# Patient Record
Sex: MALE | Race: OTHER | NOT HISPANIC OR LATINO | URBAN - METROPOLITAN AREA
[De-identification: names, ages, dates, MRNs, and addresses within clinical notes are randomized per-mention and may not be internally consistent; named-entity substitution may affect disease eponyms.]

---

## 2021-10-25 ENCOUNTER — EMERGENCY (EMERGENCY)
Facility: HOSPITAL | Age: 26
LOS: 1 days | Discharge: HOME | End: 2021-10-25
Attending: EMERGENCY MEDICINE | Admitting: EMERGENCY MEDICINE
Payer: COMMERCIAL

## 2021-10-25 VITALS
HEART RATE: 117 BPM | DIASTOLIC BLOOD PRESSURE: 75 MMHG | OXYGEN SATURATION: 99 % | HEIGHT: 72 IN | SYSTOLIC BLOOD PRESSURE: 133 MMHG | TEMPERATURE: 98 F | RESPIRATION RATE: 20 BRPM | WEIGHT: 259.93 LBS

## 2021-10-25 PROCEDURE — 99283 EMERGENCY DEPT VISIT LOW MDM: CPT

## 2021-10-25 PROCEDURE — 99284 EMERGENCY DEPT VISIT MOD MDM: CPT

## 2021-10-25 RX ORDER — FAMOTIDINE 10 MG/ML
20 INJECTION INTRAVENOUS ONCE
Refills: 0 | Status: DISCONTINUED | OUTPATIENT
Start: 2021-10-25 | End: 2021-10-29

## 2021-10-25 RX ORDER — DIPHENHYDRAMINE HCL 50 MG
50 CAPSULE ORAL ONCE
Refills: 0 | Status: DISCONTINUED | OUTPATIENT
Start: 2021-10-25 | End: 2021-10-29

## 2021-10-25 RX ORDER — SODIUM CHLORIDE 9 MG/ML
1000 INJECTION INTRAMUSCULAR; INTRAVENOUS; SUBCUTANEOUS ONCE
Refills: 0 | Status: DISCONTINUED | OUTPATIENT
Start: 2021-10-25 | End: 2021-10-29

## 2021-10-25 NOTE — ED PROVIDER NOTE - OBJECTIVE STATEMENT
24yo male who presents with anaphylactic reaction to food. pt was eating mediteranean food tonight and there was a sauce with spices and he started to break out into a rash, felt chest pain and sob, wheezing, no change in voice or stridor, pt did not take anything for the reaction

## 2021-10-25 NOTE — ED PROVIDER NOTE - PROGRESS NOTE DETAILS
pt reevaluated, feeling much better, redness, rash and swelling has improved, pt to be d/c home with prednisone, follow up with pmd for allergy testing, return if any symptoms worsen

## 2021-10-25 NOTE — ED PROVIDER NOTE - NSFOLLOWUPINSTRUCTIONS_ED_ALL_ED_FT
Food Allergy    WHAT YOU NEED TO KNOW:    A food allergy is an immune system reaction to a food. A food allergen is an ingredient or chemical in a food that causes your immune system to react. Allergic reactions happen when your immune system fights too strongly against an allergen and causes you to get sick. Allergic reactions can happen within minutes to several hours after you eat, touch, or smell the food. You can also have a second reaction up to 8 hours later.     DISCHARGE INSTRUCTIONS:    Call 911 for signs or symptoms of anaphylaxis, such as trouble breathing, swelling in your mouth or throat, or wheezing. You may also have itching, a rash, hives, or feel like you are going to faint.    Return to the emergency department if:   •Your mouth, tongue, or throat swells.      •You have itching or hives that spread all over your body.      Contact your healthcare provider if:   •You have new or worsening rashes, hives, or itching.      •You have an upset stomach or are vomiting.      •You have stomach cramps or diarrhea.      •You have questions about your treatment, medicine, or care.      Medicines:   •Epinephrine is used to treat severe allergic reactions such as anaphylaxis.       •Antihistamines decrease mild symptoms such as itching or a rash.      •Steroids: This medicine may be given to decrease inflammation.      •Short-acting bronchodilators: You may need short-acting bronchodilators to help open your airways quickly. These medicines may be called rescue inhalers or relievers. They relieve sudden, severe symptoms and start to work right away.       •Take your medicine as directed. Contact your healthcare provider if you think your medicine is not helping or if you have side effects. Tell him or her if you are allergic to any medicine. Keep a list of the medicines, vitamins, and herbs you take. Include the amounts, and when and why you take them. Bring the list or the pill bottles to follow-up visits. Carry your medicine list with you in case of an emergency.      Follow up with your healthcare provider as directed: You may need to see specialists for ongoing care. Your healthcare provider may want to test you regularly to see if the food allergy changes. Write down your questions so you remember to ask them during follow-up visits.    Steps to take for signs or symptoms of anaphylaxis:   •Immediately give 1 shot of epinephrine only into the outer thigh muscle.       •Leave the shot in place as directed. Your healthcare provider may recommend you leave it in place for up to 10 seconds before you remove it. This helps make sure all of the epinephrine is delivered.       •Call 911 and go to the emergency department, even if the shot improved symptoms. Do not drive yourself. Bring the used epinephrine shot with you.       Safety precautions to take if you are at risk for anaphylaxis:   •Keep 2 shots of epinephrine with you at all times. You may need a second shot, because epinephrine only works for about 20 minutes and symptoms may return. Your healthcare provider can show you and family members how to give the shot. Check the expiration date every month and replace it before it expires.      •Create an action plan. Your healthcare provider can help you create a written plan that explains the allergy and an emergency plan to treat a reaction. The plan explains when to give a second epinephrine shot if symptoms return or do not improve after the first. Give copies of the action plan and emergency instructions to family members, work and school staff, and  providers. Show them how to give a shot of epinephrine. Update the plan as the allergy changes.      •Be careful when you exercise. If you have had exercise-induced anaphylaxis, do not exercise right after you eat. Stop exercising right away if you start to develop any signs or symptoms of anaphylaxis. You may first feel tired, warm, or have itchy skin. Hives, swelling, and severe breathing problems may develop if you continue to exercise.      •Carry medical alert identification. Wear jewelry or carry a card that says you have a food allergy. Ask your healthcare provider where to get these items.       •Do not eat the food that causes your allergy. Even a small taste can cause an allergic reaction. Your healthcare provider or a dietitian can help you plan a balanced diet. Babies may need to drink a formula that does not contain milk or soy. A dietitian can teach you how to read labels for ingredients that cause your allergies.       •Ask about ingredients in foods prepared outside your home. When you eat out, ask what is in the food you want to order. Ask how food is prepared. Fried foods may contain small amounts of food allergens, such as nuts and shellfish.      •Use good hygiene. Do not share utensils or food. Wash your hands before and after meals.       Flu vaccine and egg allergy: Do not get the nasal spray form of the flu vaccine if you have an egg allergy. The nasal spray may contain egg proteins that can cause anaphylaxis. Ask your healthcare provider if the injection form of the vaccine is safe for

## 2021-10-25 NOTE — ED ADULT NURSE NOTE - CHPI ED NUR SYMPTOMS POS
ANXIETY/DIFFICULTY BREATHING/HIVES/ITCHING/RASH/REDNESS/SHORTNESS OF BREATH/SWELLING OF FACE, TONGUE/THROAT ITCHING

## 2021-10-25 NOTE — ED PROVIDER NOTE - PATIENT PORTAL LINK FT
You can access the FollowMyHealth Patient Portal offered by [facility] by registering at the following website: [email or web address]. By joining [employer]’s FollowMyHealth portal, you will also be able to view your health information using other applications (apps) compatible with our system.

## 2021-10-25 NOTE — ED PROVIDER NOTE - MUSCULOSKELETAL, MLM
[facility] calling re: mutual patient  Dr. Simpson would like to speak with patient's PCP  Best time to reach her would be between 12:30 and 2  They are open today until [phone]  [place] HASSAN  
Spine appears normal, range of motion is not limited, no muscle or joint tenderness

## 2021-10-25 NOTE — ED ADULT TRIAGE NOTE - CHIEF COMPLAINT QUOTE
Patient presents to ED with allergic reaction noted with shortness of breath; ate mediterranean food stated he has no allergy to anything

## 2021-10-25 NOTE — ED ADULT NURSE NOTE - OBJECTIVE STATEMENT
Pt to ED c/c hives, chest tightness, short of breath. Pt states onset of symptoms 30 minutes PTA after eating take out mediterranean food. Skin is flushed with disseminated hives, acute respiratory distress with chest tightness noted. Pt denies hx of allergies.

## 2021-10-26 VITALS
HEART RATE: 90 BPM | TEMPERATURE: 98 F | DIASTOLIC BLOOD PRESSURE: 67 MMHG | RESPIRATION RATE: 15 BRPM | SYSTOLIC BLOOD PRESSURE: 107 MMHG | OXYGEN SATURATION: 98 %

## 2021-10-26 RX ORDER — EPINEPHRINE 0.3 MG/.3ML
0.3 INJECTION INTRAMUSCULAR; SUBCUTANEOUS
Qty: 1 | Refills: 0
Start: 2021-10-26

## 2022-06-11 ENCOUNTER — EMERGENCY (EMERGENCY)
Facility: HOSPITAL | Age: 27
LOS: 1 days | Discharge: HOME | End: 2022-06-11
Attending: EMERGENCY MEDICINE | Admitting: EMERGENCY MEDICINE
Payer: COMMERCIAL

## 2022-06-11 VITALS
OXYGEN SATURATION: 100 % | DIASTOLIC BLOOD PRESSURE: 107 MMHG | HEART RATE: 143 BPM | RESPIRATION RATE: 20 BRPM | TEMPERATURE: 98 F | SYSTOLIC BLOOD PRESSURE: 152 MMHG | HEIGHT: 72 IN

## 2022-06-11 VITALS
DIASTOLIC BLOOD PRESSURE: 86 MMHG | SYSTOLIC BLOOD PRESSURE: 138 MMHG | HEART RATE: 106 BPM | TEMPERATURE: 98 F | OXYGEN SATURATION: 100 % | RESPIRATION RATE: 16 BRPM

## 2022-06-11 PROBLEM — Z78.9 OTHER SPECIFIED HEALTH STATUS: Chronic | Status: ACTIVE | Noted: 2021-10-25

## 2022-06-11 PROCEDURE — 99053 MED SERV 10PM-8AM 24 HR FAC: CPT

## 2022-06-11 PROCEDURE — 99284 EMERGENCY DEPT VISIT MOD MDM: CPT

## 2022-06-11 RX ORDER — EPINEPHRINE 0.3 MG/.3ML
0.3 INJECTION INTRAMUSCULAR; SUBCUTANEOUS ONCE
Refills: 0 | Status: COMPLETED | OUTPATIENT
Start: 2022-06-11 | End: 2022-06-11

## 2022-06-11 RX ORDER — DIPHENHYDRAMINE HCL 50 MG
25 CAPSULE ORAL ONCE
Refills: 0 | Status: COMPLETED | OUTPATIENT
Start: 2022-06-11 | End: 2022-06-11

## 2022-06-11 RX ORDER — FAMOTIDINE 10 MG/ML
20 INJECTION INTRAVENOUS ONCE
Refills: 0 | Status: COMPLETED | OUTPATIENT
Start: 2022-06-11 | End: 2022-06-11

## 2022-06-11 RX ORDER — EPINEPHRINE 0.3 MG/.3ML
0.3 INJECTION INTRAMUSCULAR; SUBCUTANEOUS
Qty: 1 | Refills: 0
Start: 2022-06-11

## 2022-06-11 RX ADMIN — Medication 60 MILLIGRAM(S): at 01:55

## 2022-06-11 RX ADMIN — FAMOTIDINE 20 MILLIGRAM(S): 10 INJECTION INTRAVENOUS at 01:56

## 2022-06-11 RX ADMIN — EPINEPHRINE 0.3 MILLIGRAM(S): 0.3 INJECTION INTRAMUSCULAR; SUBCUTANEOUS at 01:57

## 2022-06-11 RX ADMIN — Medication 25 MILLIGRAM(S): at 02:11

## 2022-06-11 NOTE — ED ADULT TRIAGE NOTE - CHIEF COMPLAINT QUOTE
Pt. states he ate spicy food 30 minutes PTA, c/o throat itchiness, hives noted to chest  and bilateral arms. Facial redness noted. Patient speaking in clear full sentences. No drooling or stridor noted. Charge RN notified. EKG in progress,

## 2022-06-11 NOTE — ED PROVIDER NOTE - OBJECTIVE STATEMENT
25 yo M presenting with difficulty breathing and throat tightening, concern for allergic reaction after eating a sandwich. Says he has never had allergies before to any food or medicine. Symptoms started several minutes after eating. Pt took 3 Benadryl and came to ED. Endorses SOB, throat tightness, hives. No N/V, diarrhea, fevers. No chest pain. Breathing well on exam, not wheezing, satting high 90s

## 2022-06-11 NOTE — ED PROVIDER NOTE - ATTENDING CONTRIBUTION TO CARE
27yo M with no PMHX or known allergies p/w pruritic facial and arm rash and sensation of throat itchiness, tightness, and sob that began immediately after eating lamb gyro sandwich from food cart shortly prior to arrival. pt took 3 benadryl tabs prior to arrival.  no n/v/d, tongue or lip swelling, or prior similar episodes    General: Patient alert in no apparent distress  Skin: Dry and intact. +urticaria to b/l UEs and redness to face. no swelling to lips or tongue   HEENT: Head atraumatic. Oral mucosa moist.   Eyes: Conjunctiva normal  Cardiac: Regular rhythm and +tachycardia. No pretibial edema b/l  Respiratory: Lungs clear b/l and symmetric. No respiratory distress. Able to speak in complete sentences.  Gastrointestinal: Abdomen soft, nondistended, nontender  Musculoskeletal: Moves all extremities spontaneously  Neurological: alert and oriented to person, place, and time  Psychiatric: Calm and cooperative    a/p  allergic reaction  due to cardiovascular (tachycardia) and dermatologic involvement (rash) will tx for ananaphylaxis   IM epi STAT  antihistamines, steroids, observe in ED  will need epipen and rx for antihistamines and steroids upon discharge    Critical care billing:  Upon my evaluation, this patient had a high probability of imminent or life-threatening deterioration due to anaphylaxis, which required my direct attention, intervention, and personal management.  The patient has a  medical condition that impairs one or more vital organ systems.  Frequent personal assessment and adjustment of medical interventions was performed.      I have personally provided 30 minutes of critical care time exclusive of time spent on separately billable procedures. Time includes review of laboratory data, radiology results, discussion with consultants, patient and family; monitoring for potential decompensation, as well as time spent retrieving data and reviewing the chart and documenting the visit. Interventions were performed as documented above.

## 2022-06-11 NOTE — ED PROVIDER NOTE - NS ED ROS FT
GENERAL: No fever, chills  EYES: no vision changes, no discharge.   ENT: no difficulty swallowing or speaking   CARDIAC: no chest pain/pressure, lower extremity swelling  PULMONARY: no cough, +SOB. +tight throat  GI: no abdominal pain, n/v/d  : no dysuria  SKIN: +rashes  NEURO: no headache, lightheadedness, paresthesia  MSK: No joint pain, myalgia, weakness.

## 2022-06-11 NOTE — ED PROVIDER NOTE - PATIENT PORTAL LINK FT
You can access the FollowMyHealth Patient Portal offered by [facility] by registering at the following website: [email or web address]. By joining [employer]’s FollowMyHealth portal, you will also be able to view your health information using other applications (apps) compatible with our system.

## 2022-06-11 NOTE — ED ADULT NURSE NOTE - OBJECTIVE STATEMENT
Pt arrives to ED rm 28 A&Ox4 and ambulatory c/o allergic reaction after eating a lamb sandwich with hot sauce tonight. Pt has never had an allergic reaction before. Pt never tried the hot sauce from this restaurant prior to tonight, then about 10 minutes after eating he felt warmth on his ears then began to feel a swelling on his forehead and his body to begin breaking out in hives. Pt states that he took 3 benadryl before 15 minutes prior to arriving to ED. Pt skin noted to be red, warm to touch. Airway is patent, 02 ast is 100% on room air, no drooling noted, able to speak in full sentences. Pt is sinus tachy on cardiac monitor, respirations are even and unlabored. Medicated as per MD order

## 2022-06-11 NOTE — ED ADULT TRIAGE NOTE - RESPIRATORY RATE (BREATHS/MIN)
Karmen Meigs is requesting a refill on the following medication(s):  Requested Prescriptions     Pending Prescriptions Disp Refills    FLUoxetine (PROZAC) 20 MG capsule 30 capsule 5     Sig: Take 1 capsule by mouth daily    traZODone (DESYREL) 50 MG tablet 30 tablet 5     Sig: Take 1 tablet by mouth nightly as needed for Sleep    hydrOXYzine (ATARAX) 50 MG tablet 30 tablet 5     Sig: Take 1 tablet by mouth 2 times daily as needed for Anxiety    busPIRone (BUSPAR) 10 MG tablet 60 tablet 5       Last Visit Date (If Applicable):  24/02/3565    Next Visit Date:    Visit date not found 20

## 2022-06-11 NOTE — ED PROVIDER NOTE - CLINICAL SUMMARY MEDICAL DECISION MAKING FREE TEXT BOX
Cici - 27 yo M presenting with difficulty breathing and throat tightening, concern for allergic reaction after eating a sandwich. Will give epi, steroids, benadryl, pepcid

## 2022-06-11 NOTE — ED PROVIDER NOTE - NSFOLLOWUPINSTRUCTIONS_ED_ALL_ED_FT
You were seen in the ED for an allergic reaction.    Take 25mg Benadryl every 6 hours for the next 3-4 days.    Take 40mg prednisone for the next 4 days.     If you have another allergic reaction, use the EpiPen as prescribed and return to the ED immediately.    Please follow up with your primary care doctor in 2-3 days. Return to the ED if you experience any worsening or new symptoms or any symptoms that concern you, including fevers, chills, shortness of breath, chest pain      Anaphylactic Reaction, Adult      An anaphylactic reaction (anaphylaxis) is a sudden, very bad allergic reaction. This affects more than one part of your body. It can be life-threatening. If you have a very bad reaction, you need to get medical help right away.      What are the causes?    This condition is caused by exposure to things that give you an allergic reaction (allergens). Common allergens include:  •Foods, such as peanuts, wheat, shellfish, milk, and eggs.      •Medicines.      •Insect bites or stings.      •Blood or parts of blood that are given for treatment (transfusions).      •Chemicals, such as latex and dyes that are used in food and in medical tests.        What are the signs or symptoms?    Signs of a very bad allergic reaction may include:  •Feeling warm in the face (flushed). Your face may turn red.      •Itchy, red, or swollen areas of skin (hives).    •Swelling of:  •The eyes or face.      •The lips, tongue, or mouth.      •The throat.      •Trouble with any of these:  •Breathing.      •Talking.      •Swallowing.        •Feeling dizzy, light-headed, or like you might faint.      •Pain or cramps in your belly.      •Vomiting.      •Watery poop (diarrhea).        How is this treated?  A person using an auto-injector pen in the thigh.   If you think you are having a very bad allergic reaction, you should do this right away:  •Give yourself a shot of medicine (epinephrine) using an auto-injector "pen." Your doctor will teach you how to use this pen.    •Call for emergency help. If you use a pen, you must still get treated in the hospital. There, you may be given:  •Medicines.      •Oxygen.      •Fluids in an IV tube.          Follow these instructions at home:    Safety     •Always keep an auto-injector pen with you. This could save your life. If you can, carry two auto-injector pens. Use the pen as told by your doctor.      • Do not drive after a reaction. Wait until your doctor says it is safe to drive.    •Make sure that you, the people who live with you, and your employer know:  •What you are allergic to, so you can stay away from it.      •How to use your auto-injector pen.        •Wear a bracelet or necklace that says you have an allergy, if your doctor tells you to do this.      •Learn the signs of a very bad allergic reaction. This way, you can treat it right away.      •Work with your doctors to make a plan for what to do if you have a very bad reaction. It is important to be ready.      If you use your auto-injector pen:     •Get more medicine (epinephrine) for your pen right away. This is important in case you have another reaction.      •Get help right away.      General instructions     •Tell all doctors who care for you that you have an allergy.    •If you have itchy, red, swollen areas of skin or a rash:  •Use an over-the-counter medicine (antihistamine) as told by your doctor.      •Put cold, wet cloths on your skin.      •Take a cool bath or shower. Avoid hot water.        •Take over-the-counter and prescription medicines only as told by your doctor.      •Keep all follow-up visits as told by your doctor.        How is this prevented?    •Avoid things that gave you a very bad allergic reaction before.      •Tell your  about your allergy when you go out to eat. If you are not sure if your meal has food that you are allergic to, ask your  before you eat it.        Get help right away if:    •You have signs of an allergic reaction. You may notice them soon after being exposed to things that give you an allergic reaction.      •You had to use your auto-injector pen. You must go to the emergency room even if the medicine seems to be working. This is because another allergic reaction may happen within 3 days (rebound anaphylaxis).      These symptoms may be an emergency. Do not wait to see if the symptoms will go away. Do this right away:   • Use your auto-injector pen as you have been told.        • Get help. Call your local emergency services (911 in the [place] Do not drive yourself to the hospital.         Summary    •An anaphylactic reaction (anaphylaxis) is a sudden, serious allergic reaction.      •This condition can be life-threatening. If you have a reaction, get medical help right away.      •Your doctor will show you how to give yourself a shot (epinephrine injection) with an auto-injector "pen."      •Always keep an auto-injector pen with you. It could save your life. Use it as told by your doctor.      •If you had to use your auto-injector pen, you must still get treated in the hospital.      This information is not intended to replace advice given to you by your health care provider. Make sure you discuss any questions you have with your health care provider.

## 2022-06-11 NOTE — ED PROVIDER NOTE - PHYSICAL EXAMINATION
Belen Bolanos MD  GENERAL: Patient awake alert NAD.  HEENT: NC/AT, Moist mucous membranes, EOMI. no tongue or lip swelling  LUNGS: CTAB, no wheezes or crackles.   CARDIAC: RRR, no m/r/g.    ABDOMEN: Soft, NT, ND, No rebound, guarding. No CVA tenderness.   EXT: No edema. No calf tenderness.   MSK: no pain with movement, no deformities.  NEURO: A&Ox3. Moving all extremities.  SKIN: Warm and dry. +bilateral arm hives  PSYCH: Normal affect.

## 2022-06-11 NOTE — ED PROVIDER NOTE - PROGRESS NOTE DETAILS
pt reassessed, feeling well.  Patient was re-evaluated and doing well. Results, including any incidental findings, were discussed. Return precautions and follow up were discussed. Patient verbalized understanding.

## 2022-09-03 ENCOUNTER — EMERGENCY (EMERGENCY)
Facility: HOSPITAL | Age: 27
LOS: 1 days | Discharge: HOME | End: 2022-09-03
Attending: EMERGENCY MEDICINE | Admitting: EMERGENCY MEDICINE
Payer: COMMERCIAL

## 2022-09-03 VITALS
OXYGEN SATURATION: 97 % | DIASTOLIC BLOOD PRESSURE: 60 MMHG | SYSTOLIC BLOOD PRESSURE: 123 MMHG | RESPIRATION RATE: 18 BRPM | HEART RATE: 73 BPM

## 2022-09-03 VITALS
DIASTOLIC BLOOD PRESSURE: 71 MMHG | HEART RATE: 130 BPM | OXYGEN SATURATION: 97 % | TEMPERATURE: 97 F | HEIGHT: 72 IN | SYSTOLIC BLOOD PRESSURE: 120 MMHG | RESPIRATION RATE: 18 BRPM | WEIGHT: 264.55 LBS

## 2022-09-03 PROCEDURE — 96375 TX/PRO/DX INJ NEW DRUG ADDON: CPT

## 2022-09-03 PROCEDURE — 99291 CRITICAL CARE FIRST HOUR: CPT | Mod: 25

## 2022-09-03 PROCEDURE — 99291 CRITICAL CARE FIRST HOUR: CPT

## 2022-09-03 PROCEDURE — 96374 THER/PROPH/DIAG INJ IV PUSH: CPT

## 2022-09-03 RX ORDER — EPINEPHRINE 0.3 MG/.3ML
0.3 INJECTION INTRAMUSCULAR; SUBCUTANEOUS
Qty: 2 | Refills: 0
Start: 2022-09-03

## 2022-09-03 RX ORDER — FAMOTIDINE 10 MG/ML
20 INJECTION INTRAVENOUS ONCE
Refills: 0 | Status: COMPLETED | OUTPATIENT
Start: 2022-09-03 | End: 2022-09-03

## 2022-09-03 RX ORDER — DIPHENHYDRAMINE HCL 50 MG
50 CAPSULE ORAL ONCE
Refills: 0 | Status: COMPLETED | OUTPATIENT
Start: 2022-09-03 | End: 2022-09-03

## 2022-09-03 RX ORDER — SODIUM CHLORIDE 9 MG/ML
1000 INJECTION INTRAMUSCULAR; INTRAVENOUS; SUBCUTANEOUS ONCE
Refills: 0 | Status: COMPLETED | OUTPATIENT
Start: 2022-09-03 | End: 2022-09-03

## 2022-09-03 RX ADMIN — FAMOTIDINE 20 MILLIGRAM(S): 10 INJECTION INTRAVENOUS at 02:29

## 2022-09-03 RX ADMIN — SODIUM CHLORIDE 1000 MILLILITER(S): 9 INJECTION INTRAMUSCULAR; INTRAVENOUS; SUBCUTANEOUS at 02:29

## 2022-09-03 RX ADMIN — Medication 50 MILLIGRAM(S): at 02:29

## 2022-09-03 RX ADMIN — Medication 125 MILLIGRAM(S): at 02:29

## 2022-09-03 NOTE — ED PROVIDER NOTE - PROGRESS NOTE DETAILS
symptoms resolved symptoms still resolved Reevaluated patient at bedside, patient still asymptomatic, wants to be d/c home to f/u as outpt.  Discussed the results of all diagnostic testing in ED and copies of all reports given.   An opportunity to ask questions was given.  Discussed the importance of prompt, close medical follow-up.  Patient will return with any changes, concerns or persistent / worsening symptoms.  Understanding of all instructions verbalized.

## 2022-09-03 NOTE — ED PROVIDER NOTE - PATIENT PORTAL LINK FT
You can access the FollowMyHealth Patient Portal offered by [facility] by registering at the following website: [email or web address]. By joining [employer]’s FollowMyHealth portal, you will also be able to view your health information using other applications (apps) compatible with our system.

## 2022-09-03 NOTE — ED PROVIDER NOTE - CARE PROVIDER_API CALL
Eugenia Mcgraw)  Family Medicine  [address] [place] [id number]  Phone: [phone]  Fax: [phone]  Follow Up Time: 1-3 Days

## 2022-09-03 NOTE — ED PROVIDER NOTE - ENMT, MLM
Airway patent. Mouth with normal mucosa. Throat has no vesicles, no oropharyngeal exudates and uvula is midline. no swelling of face lips mouth tongue or throat

## 2022-09-03 NOTE — ED ADULT NURSE NOTE - CHIEF COMPLAINT QUOTE
pt ate kabab and develop allergic reacrtion redness over body and pt took epi pen and benadryl no distress

## 2022-09-03 NOTE — ED ADULT NURSE NOTE - OBJECTIVE STATEMENT
Patient received complaining of allergic reaction after eating a kebab earlier this morning, patient unsure what caused the reaction. States used his epi pen and took 2 benadryl PO. Patient is able to talk, breath and swallow with no difficulty, has generalized redness noted, no swelling noted on face. Patient is AOx4, ambulatory, safety precautions in place, interventions implemented, awaiting evaluation.

## 2022-09-03 NOTE — ED ADULT NURSE REASSESSMENT NOTE - NS ED NURSE REASSESS COMMENT FT1
Patient redness decreasing, patient states in no acute distress at this time, awaiting re-evaluation.

## 2022-09-03 NOTE — ED PROVIDER NOTE - OBJECTIVE STATEMENT
Patient with hx allergic reaction to unk food complaining of allergic reaction status post eating food tonight.  Patient relates he had a kebab and developed diffuse itchy rash and some shortness of breath.  Patient took 50 mg of Benadryl p.o. and 1 EpiPen with improvement of his shortness of breath.  Patient denies fever headache nausea vomiting chest pain cough wheezing abd pain swelling of lips mouth tongue or throat.  PMD Ezzi

## 2022-09-03 NOTE — ED PROVIDER NOTE - CHPI ED SYMPTOMS NEG
no cough/no difficulty swallowing/no swelling of face, tongue/no throat itching/no vomiting/no wheezing/no difficulty breathing

## 2022-09-03 NOTE — ED PROVIDER NOTE - CLINICAL SUMMARY MEDICAL DECISION MAKING FREE TEXT BOX
Patient complaining of allergic reaction status post eating food.  Patient relates he had a swarm a and developed diffuse itchy rash and some shortness of breath.  Patient took 50 mg of Benadryl p.o. and 1 EpiPen with improvement of his shortness of breath.  Patient denies fever headache nausea vomiting chest pain cough wheezing swelling of lips mouth tongue or throat.  Plan IV fluid Solu-Medrol Pepcid and Benadryl and observation

## 2023-03-02 NOTE — ED ADULT NURSE NOTE - NS ED NURSE LEVEL OF CONSCIOUSNESS SPEECH
Post-Care Instructions: Patient instructed to not lie down for 4 hours and limit physical activity for 24 hours. Speaking Coherently

## 2023-12-16 ENCOUNTER — EMERGENCY (EMERGENCY)
Facility: HOSPITAL | Age: 28
LOS: 1 days | Discharge: HOME | End: 2023-12-16
Attending: INTERNAL MEDICINE | Admitting: INTERNAL MEDICINE
Payer: COMMERCIAL

## 2023-12-16 VITALS
RESPIRATION RATE: 19 BRPM | SYSTOLIC BLOOD PRESSURE: 132 MMHG | DIASTOLIC BLOOD PRESSURE: 76 MMHG | OXYGEN SATURATION: 99 % | HEART RATE: 91 BPM

## 2023-12-16 VITALS
HEART RATE: 101 BPM | TEMPERATURE: 98 F | HEIGHT: 72 IN | WEIGHT: 259.93 LBS | OXYGEN SATURATION: 97 % | SYSTOLIC BLOOD PRESSURE: 157 MMHG | RESPIRATION RATE: 18 BRPM | DIASTOLIC BLOOD PRESSURE: 97 MMHG

## 2023-12-16 PROCEDURE — 99283 EMERGENCY DEPT VISIT LOW MDM: CPT

## 2023-12-16 PROCEDURE — 99284 EMERGENCY DEPT VISIT MOD MDM: CPT

## 2023-12-16 RX ORDER — FAMOTIDINE 10 MG/ML
1 INJECTION INTRAVENOUS
Qty: 10 | Refills: 0
Start: 2023-12-16 | End: 2023-12-20

## 2023-12-16 NOTE — ED PROVIDER NOTE - PATIENT PORTAL LINK FT
You can access the FollowMyHealth Patient Portal offered by [facility] by registering at the following website: [email or web address]. By joining [employer]’s FollowMyHealth portal, you will also be able to view your health information using other applications (apps) compatible with our system. You can access the FollowMyHealth Patient Portal offered by [facility] by registering at the following website: [email or web address]. By joining [employer]’s FollowMyHealth portal, you will also be able to view your health information using other applications (apps) compatible with our system.

## 2023-12-16 NOTE — ED ADULT NURSE NOTE - NSFALLUNIVINTERV_ED_ALL_ED
Bed/Stretcher in lowest position, wheels locked, appropriate side rails in place/Call bell, personal items and telephone in reach/Instruct patient to call for assistance before getting out of bed/chair/stretcher/Non-slip footwear applied when patient is off stretcher/[place] to call system/Physically safe environment - no spills, clutter or unnecessary equipment/Purposeful proactive rounding/Room/bathroom lighting operational, light cord in reach Bed/Stretcher in lowest position, wheels locked, appropriate side rails in place/Call bell, personal items and telephone in reach/Instruct patient to call for assistance before getting out of bed/chair/stretcher/Non-slip footwear applied when patient is off stretcher/[place] to call system/Physically safe environment - no spills, clutter or unnecessary equipment/Purposeful proactive rounding/Room/bathroom lighting operational, light cord in reach

## 2023-12-16 NOTE — ED ADULT NURSE NOTE - CHIEF COMPLAINT QUOTE
26 y/o male received ambulatory to triage. Alert and oriented x4. C/o hives all over and itching. Pt reports he was eating at a restaurant and started having reaction to what he ate, but does not know what he is allergic to. Airway open and patent. Pt speaking in clear full sentences. Pt allergen is unknown. Pt took 2 tabs of benadryl prior to arrival.

## 2023-12-16 NOTE — ED PROVIDER NOTE - OBJECTIVE STATEMENT
28 y/o male received ambulatory to triage. Alert and oriented x4. C/o hives all over and itching. Pt reports he was eating at a restaurant and started having reaction to what he ate, but does not know what he is allergic to. Airway open and patent. Pt speaking in clear full sentences. Pt allergen is unknown. Pt took 2 tabs of benadryl prior to arrival.  allergic reaction 26 y/o male C/C  hives  and itching after  restaurant meal. Pt took 2 tabs of benadryl prior to arrival and his symptoms resolved   no headache, no chest pain, no SOB, no palpitations, no n/v/d, no urinary symptoms, no bleeding. no neuro changes. 28 y/o male C/C  hives  and itching after  restaurant meal. Pt took 2 tabs of benadryl prior to arrival and his symptoms resolved   no headache, no chest pain, no SOB, no palpitations, no n/v/d, no urinary symptoms, no bleeding. no neuro changes.

## 2023-12-16 NOTE — ED ADULT NURSE NOTE - OBJECTIVE STATEMENT
Pt states he was eating mediterranean food and had an allergic rxn. States his neck and arms began to swell and get red. 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 Took 2 benadryl, symptoms resolved.

## 2023-12-16 NOTE — ED PROVIDER NOTE - NSFOLLOWUPCLINICS_GEN_ALL_ED_FT
[facility]  Allergy  [address] [place] [address]  Phone: [phone]  Fax:      [facility]  Allergy  [address] [place] [address]  Phone: [phone]  Fax:

## 2023-12-16 NOTE — ED ADULT TRIAGE NOTE - CHIEF COMPLAINT QUOTE
26 y/o male received ambulatory to triage. Alert and oriented x4. C/o hives all over and itching. Pt reports he was eating at a restaurant and started having reaction to what he ate, but does not know what he is allergic to. Airway open and patent. Pt speaking in clear full sentences. Pt allergen is unknown. Pt took 2 tabs of benadryl prior to arrival. 28 y/o male received ambulatory to triage. Alert and oriented x4. C/o hives all over and itching. Pt reports he was eating at a restaurant and started having reaction to what he ate, but does not know what he is allergic to. Airway open and patent. Pt speaking in clear full sentences. Pt allergen is unknown. Pt took 2 tabs of benadryl prior to arrival.

## 2024-01-25 ENCOUNTER — LABORATORY RESULT (OUTPATIENT)
Age: 29
End: 2024-01-25

## 2024-01-25 ENCOUNTER — APPOINTMENT (OUTPATIENT)
Dept: PEDIATRIC ALLERGY IMMUNOLOGY | Facility: CLINIC | Age: 29
End: 2024-01-25
Payer: COMMERCIAL

## 2024-01-25 VITALS
SYSTOLIC BLOOD PRESSURE: 122 MMHG | HEIGHT: 72 IN | BODY MASS INDEX: 33.86 KG/M2 | HEART RATE: 73 BPM | OXYGEN SATURATION: 96 % | DIASTOLIC BLOOD PRESSURE: 77 MMHG | WEIGHT: 250 LBS

## 2024-01-25 DIAGNOSIS — L65.9 NONSCARRING HAIR LOSS, UNSPECIFIED: ICD-10-CM

## 2024-01-25 DIAGNOSIS — T78.2XXA ANAPHYLACTIC SHOCK, UNSPECIFIED, INITIAL ENCOUNTER: ICD-10-CM

## 2024-01-25 PROBLEM — Z00.00 ENCOUNTER FOR PREVENTIVE HEALTH EXAMINATION: Status: ACTIVE | Noted: 2024-01-25

## 2024-01-25 PROCEDURE — 36415 COLL VENOUS BLD VENIPUNCTURE: CPT

## 2024-01-25 PROCEDURE — 99203 OFFICE O/P NEW LOW 30 MIN: CPT | Mod: 25,GC

## 2024-01-25 RX ORDER — EPINEPHRINE 0.3 MG/.3ML
0.3 INJECTION INTRAMUSCULAR
Qty: 2 | Refills: 2 | Status: ACTIVE | COMMUNITY
Start: 2024-01-25 | End: 1900-01-01

## 2024-01-25 RX ORDER — FINASTERIDE 5 MG/1
5 TABLET, FILM COATED ORAL
Refills: 0 | Status: ACTIVE | COMMUNITY
Start: 2024-01-25

## 2024-01-29 PROBLEM — L65.9 HAIR LOSS: Status: ACTIVE | Noted: 2024-01-25

## 2024-01-29 NOTE — CONSULT LETTER
[Dear  ___] : Dear  [unfilled], [Consult Letter:] : I had the pleasure of evaluating your patient, [unfilled]. [Please see my note below.] : Please see my note below. [Consult Closing:] : Thank you very much for allowing me to participate in the care of this patient.  If you have any questions, please do not hesitate to contact me. [Sincerely,] : Sincerely, [FreeTextEntry2] : Eugenia Mcgraw MD [FreeTextEntry3] : Evy Guerra MD Attending Physician  Division of Allergy/Immunology  [facility]    of Medicine and Pediatrics [facility] [place] [address] Tel: [phone] Fax: [phone] Email: [email or web address]

## 2024-01-29 NOTE — REVIEW OF SYSTEMS
[Fatigue] : no fatigue [Fever] : no fever [Eye Discharge] : no eye discharge [Eye Itching] : no itchy eyes [Puffy Eyelids] : no puffy ~T eyelids [Rhinorrhea] : no rhinorrhea [Nasal Congestion] : no nasal congestion [Throat Itching] : no throat itching [Post Nasal Drip] : no post nasal drip [Difficulty Breathing] : no dyspnea [SOB with Exertion] : no dyspnea on exertion [Cough] : no cough [Urticaria] : no urticaria [Pruritus] : no pruritus [Swelling] : no swelling [Recurrent Sinus Infections] : no recurrent sinus infections [Recurrent Throat Infections] : no recurrence of throat infections [Recurrent Bronchitis] : no recurrent bronchitis [Recurrent Ear Infections] : no recurrence or ear infections [Recurrent Skin Infections] : no recurrent skin infections [Recurrent Pneumonia] : no ~T recurrent pneumonia [Received Influenza Vaccine this Past Year] : Patient has not received the Influenza vaccine this past year

## 2024-01-29 NOTE — HISTORY OF PRESENT ILLNESS
[de-identified] : Patient is a 27yo M with no significant medical history presenting for allergy testing.  Patient reports he has had multiple episodes of hives, shortness of breath while eating.  First episode - one year ago --> was eating takeout mediterranean food (chicken shawarma, falafel, pickled salad/sauce from [place] in [place]), felt heat spreading in the back of his head, broke out in hives over arms and joints, became short of breath and hives spread. Went to the ER, given epipen and steroids, did not take subsequent Prednisone course. Was referred to an allergist, completed skin testing which all came back negative. Does not know name of the restaurant.  Second episode - about half a year ago --> was eating boneless chicken and rice prior to episode at a restaurant that is familiar to him ([place] food), started to feel heat in the back of his head again and went home, administered epipen to self. Taken to ER by brother, given IV steroids again. Believes it may be related to chemical in food coloring.  Third episode Dec 16th 2023 --> reaction was to Cava bowl, ate chicken with lemon tahini sauce; hives around elbow, took benadryl, went to the ER. Was prescribed an epipen but no other interventions done. Was eating chicken and rice.  Of [place] descent. In general, cooks food with coriander, turmeric, cumin, garlic, parsley, basil, oregano, salt, pepper. Has tolerated tahini since the incident without issue.  Food allergies: no known allergies; tolerates dairy, eggs, peanuts, tree nuts, sesame. No history of asthma, eczema. No pets at home, denies smoking. [FreeTextEntry2] : Epipen, Steroids

## 2024-01-29 NOTE — CONSULT LETTER
[Dear  ___] : Dear  [unfilled], [Consult Letter:] : I had the pleasure of evaluating your patient, [unfilled]. [Please see my note below.] : Please see my note below. [Consult Closing:] : Thank you very much for allowing me to participate in the care of this patient.  If you have any questions, please do not hesitate to contact me. [Sincerely,] : Sincerely, [FreeTextEntry2] : Eugenia Mcgraw MD [FreeTextEntry3] : Evy Guerra MD Attending Physician  Division of Allergy/Immunology  [facility]    of Medicine and Pediatrics [facility] [place] [address] Tel: [phone] Fax: [phone] Email: [email or web address]

## 2024-01-29 NOTE — HISTORY OF PRESENT ILLNESS
[de-identified] : Patient is a 29yo M with no significant medical history presenting for allergy testing.  Patient reports he has had multiple episodes of hives, shortness of breath while eating.  First episode - one year ago --> was eating takeout mediterranean food (chicken shawarma, falafel, pickled salad/sauce from [place] in [place]), felt heat spreading in the back of his head, broke out in hives over arms and joints, became short of breath and hives spread. Went to the ER, given epipen and steroids, did not take subsequent Prednisone course. Was referred to an allergist, completed skin testing which all came back negative. Does not know name of the restaurant.  Second episode - about half a year ago --> was eating boneless chicken and rice prior to episode at a restaurant that is familiar to him ([place] food), started to feel heat in the back of his head again and went home, administered epipen to self. Taken to ER by brother, given IV steroids again. Believes it may be related to chemical in food coloring.  Third episode Dec 16th 2023 --> reaction was to Cava bowl, ate chicken with lemon tahini sauce; hives around elbow, took benadryl, went to the ER. Was prescribed an epipen but no other interventions done. Was eating chicken and rice.  Of [place] descent. In general, cooks food with coriander, turmeric, cumin, garlic, parsley, basil, oregano, salt, pepper. Has tolerated tahini since the incident without issue.  Food allergies: no known allergies; tolerates dairy, eggs, peanuts, tree nuts, sesame. No history of asthma, eczema. No pets at home, denies smoking. [FreeTextEntry2] : Epipen, Steroids

## 2024-02-07 ENCOUNTER — LABORATORY RESULT (OUTPATIENT)
Age: 29
End: 2024-02-07

## 2024-02-12 LAB
CHICKPEA IGE AB [UNITS/VOLUME] IN SERUM: <0.1 KUA/L
DEPRECATED CHICK PEA IGE RAST QL: 0
DEPRECATED PEANUT IGE RAST QL: 0 (ref 0–?)
DEPRECATED SESAME SEED IGE RAST QL: 0 (ref 0–?)
Lab: 0
Lab: <0.1 KUA/L
PEANUT (RARA H) 1 IGE QN: <0.1 KUA/L
PEANUT (RARA H) 2 IGE QN: <0.1 KUA/L
PEANUT (RARA H) 3 IGE QN: <0.1 KUA/L
PEANUT (RARA H) 6 IGE QN: <0.1 KUA/L
PEANUT (RARA H) 8 IGE QN: <0.1 KUA/L
PEANUT (RARA H) 9 IGE QN: <0.1 KUA/L
PEANUT IGE QN: <0.1 KUA/L
RARA H 6 STORAGE PROTEIN (F447) CLASS: 0 (ref 0–?)
RARA H1 STORAGE PROTEIN (F422) CLASS: 0 (ref 0–?)
RARA H2 STORAGE PROTEIN (F423) CLASS: 0 (ref 0–?)
RARA H3 STORAGE PROTEIN (F424) CLASS: 0 (ref 0–?)
RARA H8 PR-10 PROTEIN (F352) CLASS: 0 (ref 0–?)
RARA H9 LIPID TRANSFERTP (F427) CLASS: 0 (ref 0–?)
SESAME SEED IGE QN: <0.1 KUA/L